# Patient Record
Sex: FEMALE | Race: BLACK OR AFRICAN AMERICAN | NOT HISPANIC OR LATINO | URBAN - METROPOLITAN AREA
[De-identification: names, ages, dates, MRNs, and addresses within clinical notes are randomized per-mention and may not be internally consistent; named-entity substitution may affect disease eponyms.]

---
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
## 2021-05-28 NOTE — TELEPHONE ENCOUNTER
New Appointment Needed  What is the reason for the visit:    Mantoux Placement  Appt Request  What is the purpose of the mantoux?:  Work: [employer]  Is there a form to be completed?:   No  How soon do you need the mantoux placed?:  date: AS SOON AS CAN BE SCHEDULED    Provider Preference: Any available  How soon do you need to be seen?: as soon as can be scheduled  Waitlist offered?: No  Okay to leave a detailed message:  Yes

## 2021-05-28 NOTE — TELEPHONE ENCOUNTER
Called and advised patient she needs to be seen for a quick office visit with a provider before the mantoux can be placed. Patient scheduled with DE for Monday.      Keren Grant, [facility]

## 2023-09-01 ENCOUNTER — NURSE TRIAGE (OUTPATIENT)
Dept: NURSING | Facility: CLINIC | Age: 23
End: 2023-09-01

## 2023-09-01 NOTE — TELEPHONE ENCOUNTER
"Triage Call:     Pt calling for some home care advice on how to treat her allery sx.     Nasal blockage and congestin \"for years\"  Couple days ago it got worse  ENT referral was made by [facility]     Sneezing  Cough here and there  Itchy eyes    Disposition: See in office within 2 weeks. Pt was given care advice and is calling the ENT speciaist as it was previously recommended.    Additional Information   Negative: Wheezing (high pitched whistling sound) and previous asthma attacks or use of asthma medicines   Negative: Doesn't match the SYMPTOMS for nasal allergy   Negative: Patient sounds very sick or weak to the triager   Negative: Lots of coughing   Negative: Lots of yellow or green discharge from nose, present > 3 days   Negative: Nasal discharge present > 10 days   Negative: MODERATE-SEVERE nasal allergy symptoms (i.e., interfere with sleep, school, or work) and taking antihistamines > 2 days   Negative: Patient wants to be seen   Negative: Nasal allergies occur only certain times of year and diagnosis of hay fever has never been confirmed by a physician   Nasal allergies occur year-round    Protocols used: Hay Fever - Nasal Allergies-A-[place]  Kimberley Rajput RN  [facility] Nurse Advisor 10:49 AM 9/1/2023  "